# Patient Record
Sex: MALE | NOT HISPANIC OR LATINO | URBAN - METROPOLITAN AREA
[De-identification: names, ages, dates, MRNs, and addresses within clinical notes are randomized per-mention and may not be internally consistent; named-entity substitution may affect disease eponyms.]

---

## 2021-06-15 ENCOUNTER — OFFICE VISIT (OUTPATIENT)
Dept: URBAN - METROPOLITAN AREA CLINIC 35 | Facility: CLINIC | Age: 86
End: 2021-06-15

## 2021-06-15 ENCOUNTER — WEB ENCOUNTER (OUTPATIENT)
Dept: URBAN - METROPOLITAN AREA CLINIC 35 | Facility: CLINIC | Age: 86
End: 2021-06-15

## 2021-06-15 ENCOUNTER — DASHBOARD ENCOUNTERS (OUTPATIENT)
Age: 86
End: 2021-06-15

## 2021-06-15 VITALS
HEIGHT: 62 IN | BODY MASS INDEX: 21.35 KG/M2 | SYSTOLIC BLOOD PRESSURE: 116 MMHG | WEIGHT: 116 LBS | TEMPERATURE: 98.2 F | DIASTOLIC BLOOD PRESSURE: 56 MMHG | HEART RATE: 66 BPM

## 2021-06-15 PROBLEM — 79922009 EPIGASTRIC PAIN: Status: ACTIVE | Noted: 2021-06-15

## 2021-06-15 PROBLEM — 87522002 IRON DEFICIENCY ANEMIA: Status: ACTIVE | Noted: 2021-06-15

## 2021-06-15 RX ORDER — PREGABALIN 75 MG/1
1 CAPSULE CAPSULE ORAL TWICE A DAY
Status: ACTIVE | COMMUNITY

## 2021-06-15 RX ORDER — FLUTICASONE FUROATE, UMECLIDINIUM BROMIDE AND VILANTEROL TRIFENATATE 100; 62.5; 25 UG/1; UG/1; UG/1
1 PUFF POWDER RESPIRATORY (INHALATION) ONCE A DAY
Status: ACTIVE | COMMUNITY

## 2021-06-15 RX ORDER — ASPIRIN 81 MG/1
1 TABLET TABLET, COATED ORAL ONCE A DAY
Qty: 30 | Status: ACTIVE | COMMUNITY

## 2021-06-15 RX ORDER — ATORVASTATIN CALCIUM 10 MG/1
1 TABLET TABLET, FILM COATED ORAL ONCE A DAY
Qty: 30 | Status: ACTIVE | COMMUNITY

## 2021-06-15 RX ORDER — FLUOXETINE HYDROCHLORIDE 20 MG/1
1 CAPSULE CAPSULE ORAL ONCE A DAY
Qty: 30 | Status: ACTIVE | COMMUNITY

## 2021-06-15 RX ORDER — ALBUTEROL SULFATE 90 UG/1
1 PUFF AS NEEDED AEROSOL, METERED RESPIRATORY (INHALATION)
Status: ACTIVE | COMMUNITY

## 2021-06-15 RX ORDER — TAMSULOSIN HYDROCHLORIDE 0.4 MG/1
1 CAPSULE CAPSULE ORAL ONCE A DAY
Qty: 30 | Status: ACTIVE | COMMUNITY

## 2021-06-15 NOTE — EXAM-MIGRATED EXAMINATIONS
GENERAL APPEARANCE: - alert, in no acute distress, well developed, well nourished;   HEAD: - normocephalic, atraumatic;   ORAL CAVITY: - mucosa moist;   THROAT: - clear;

## 2021-06-15 NOTE — HPI-MIGRATED HPI
;   ;   ;     Loss of Appetite : Patient presents for consult of loss of appetite . Patient has been experiencing symptoms for the past 2 weeks . Per patients daughter patient has stopped eating regular portioned meals . She states he swill state he isnt hungry but if food is placed in front of him he will eat small portion. Patient denies nausea or vomiting . Patient admits small difference in weight nothing to be concerned with . Patient denies associated abdominal pain.   Of note patient was admitted on 05/26/2021 for Pneumonia .  Patient had last EGD in 2015 with results revealing Mild Gastritis , Moderate chronic inflammation w/ reactive changes including villous atrophy, Moderate goblet cell intestinal metaplasia     IMPRESSION:  1.  Incomplete distention of the transverse, descending, and sigmoid colon as well as the rectum limit their evaluation. A degree of mild coloproctitis is not excluded. Please correlate clinically. 2.  Nonspecific low density structure in the right inguinal region could reflect small amount of fluid within a inguinal hernia versus low-density lymph node. Recommend continued attention on follow-up. 3.  Additional nonemergent/chronic findings as above. ;   Abdominal Pain :           ;   Anemia : Patient was recently diagnosed with ARIS . Patient took one  OTC Iron tablet and the next day had black stools . Patient was concerned with internal bleeding and went to ER , during that visit fecal occult was completed that was negative. Patient had labs completed and Hemoglobin was stable. Paient denies nausea or vomiting . ;

## 2021-06-16 ENCOUNTER — TELEPHONE ENCOUNTER (OUTPATIENT)
Dept: URBAN - METROPOLITAN AREA CLINIC 35 | Facility: CLINIC | Age: 86
End: 2021-06-16

## 2021-06-16 RX ORDER — FERROUS FUMARATE AND POLYSACCHRIDE IRON VITAMIN MINERAL COMPLEX SUPPLEMENT 191.2; 135.9; 1; 210; 20; 5; 5; 7; 25; 3 MG/1; MG/1; MG/1; MG/1; MG/1; MG/1; MG/1; MG/1; MG/1; UG/1
1 CAPSULE CAPSULE ORAL ONCE A DAY
Qty: 30 | Refills: 2 | OUTPATIENT
Start: 2021-06-16

## 2021-06-17 LAB
% SATURATION: 19
ABSOLUTE BASOPHILS: 40
ABSOLUTE EOSINOPHILS: 40
ABSOLUTE LYMPHOCYTES: 1277
ABSOLUTE MONOCYTES: 338
ABSOLUTE NEUTROPHILS: 1404
ALBUMIN/GLOBULIN RATIO: 1.1
ALBUMIN: 3.5
ALKALINE PHOSPHATASE: 57
ALT: 22
AST: 27
BASOPHILS: 1.3
BILIRUBIN, TOTAL: 0.4
BUN/CREATININE RATIO: (no result)
CALCIUM: 8.7
CARBON DIOXIDE: 28
CHLORIDE: 94
CREATININE: 0.75
EGFR AFRICAN AMERICAN: 98
EGFR NON-AFR. AMERICAN: 84
EOSINOPHILS: 1.3
FERRITIN: 133
GLOBULIN: 3.1
GLUCOSE: 110
HEMATOCRIT: 35
HEMOGLOBIN: 10.6
IRON BINDING CAPACITY: 295
IRON, TOTAL: 56
LYMPHOCYTES: 41.2
MCH: 21.5
MCHC: 30.3
MCV: 70.9
MONOCYTES: 10.9
MPV: (no result)
NEUTROPHILS: 45.3
PLATELET COUNT: 166
POTASSIUM: 4.3
PROTEIN, TOTAL: 6.6
RDW: 15.4
RED BLOOD CELL COUNT: 4.94
SODIUM: 128
UREA NITROGEN (BUN): 10
WHITE BLOOD CELL COUNT: 3.1

## 2021-07-21 ENCOUNTER — OFFICE VISIT (OUTPATIENT)
Dept: URBAN - METROPOLITAN AREA MEDICAL CENTER 10 | Facility: MEDICAL CENTER | Age: 86
End: 2021-07-21

## 2021-09-08 ENCOUNTER — WEB ENCOUNTER (OUTPATIENT)
Dept: URBAN - METROPOLITAN AREA CLINIC 35 | Facility: CLINIC | Age: 86
End: 2021-09-08

## 2021-09-16 ENCOUNTER — WEB ENCOUNTER (OUTPATIENT)
Dept: URBAN - METROPOLITAN AREA CLINIC 35 | Facility: CLINIC | Age: 86
End: 2021-09-16

## 2021-09-16 ENCOUNTER — TELEPHONE ENCOUNTER (OUTPATIENT)
Dept: URBAN - METROPOLITAN AREA CLINIC 35 | Facility: CLINIC | Age: 86
End: 2021-09-16

## 2021-09-16 RX ORDER — FERROUS FUMARATE AND POLYSACCHRIDE IRON VITAMIN MINERAL COMPLEX SUPPLEMENT 191.2; 135.9; 1; 210; 20; 5; 5; 7; 25; 3 MG/1; MG/1; MG/1; MG/1; MG/1; MG/1; MG/1; MG/1; MG/1; UG/1
1 CAPSULE CAPSULE ORAL ONCE A DAY
Qty: 30 CAPSULE | Refills: 5 | OUTPATIENT
Start: 2021-06-16

## 2021-10-12 ENCOUNTER — TELEPHONE ENCOUNTER (OUTPATIENT)
Dept: URBAN - METROPOLITAN AREA CLINIC 35 | Facility: CLINIC | Age: 86
End: 2021-10-12

## 2021-10-12 ENCOUNTER — WEB ENCOUNTER (OUTPATIENT)
Dept: URBAN - METROPOLITAN AREA CLINIC 35 | Facility: CLINIC | Age: 86
End: 2021-10-12

## 2021-10-13 ENCOUNTER — OFFICE VISIT (OUTPATIENT)
Dept: URBAN - METROPOLITAN AREA MEDICAL CENTER 10 | Facility: MEDICAL CENTER | Age: 86
End: 2021-10-13

## 2022-04-30 ENCOUNTER — TELEPHONE ENCOUNTER (OUTPATIENT)
Dept: URBAN - METROPOLITAN AREA CLINIC 121 | Facility: CLINIC | Age: 87
End: 2022-04-30

## 2022-05-01 ENCOUNTER — TELEPHONE ENCOUNTER (OUTPATIENT)
Dept: URBAN - METROPOLITAN AREA CLINIC 121 | Facility: CLINIC | Age: 87
End: 2022-05-01

## 2022-05-01 RX ORDER — LISINOPRIL 20 MG/1
TABLET ORAL
Status: ACTIVE | COMMUNITY
Start: 2007-04-30

## 2022-05-01 RX ORDER — ASPIRIN 81 MG/1
TABLET, CHEWABLE ORAL
Status: ACTIVE | COMMUNITY
Start: 2007-04-30

## 2022-05-01 RX ORDER — GABAPENTIN 800 MG/1
TABLET ORAL
Status: ACTIVE | COMMUNITY
Start: 2007-04-30

## 2024-10-09 ENCOUNTER — CLAIMS CREATED FROM THE CLAIM WINDOW (OUTPATIENT)
Dept: URBAN - METROPOLITAN AREA MEDICAL CENTER 39 | Facility: MEDICAL CENTER | Age: 89
End: 2024-10-09
Payer: MEDICARE

## 2024-10-09 DIAGNOSIS — R14.0 ABDOMINAL BLOATING: ICD-10-CM

## 2024-10-09 DIAGNOSIS — R93.3 ABN FINDINGS-GI TRACT: ICD-10-CM

## 2024-10-09 DIAGNOSIS — K62.5 RECTAL BLEEDING: ICD-10-CM

## 2024-10-09 DIAGNOSIS — R19.7 DIARRHEA: ICD-10-CM

## 2024-10-09 PROCEDURE — 99254 IP/OBS CNSLTJ NEW/EST MOD 60: CPT | Performed by: INTERNAL MEDICINE

## 2024-10-09 PROCEDURE — 99204 OFFICE O/P NEW MOD 45 MIN: CPT | Performed by: INTERNAL MEDICINE
